# Patient Record
Sex: MALE | Race: OTHER | NOT HISPANIC OR LATINO | URBAN - METROPOLITAN AREA
[De-identification: names, ages, dates, MRNs, and addresses within clinical notes are randomized per-mention and may not be internally consistent; named-entity substitution may affect disease eponyms.]

---

## 2024-12-29 ENCOUNTER — INPATIENT (INPATIENT)
Facility: HOSPITAL | Age: 34
LOS: 1 days | Discharge: HOME | DRG: 443 | End: 2024-12-31
Attending: INTERNAL MEDICINE | Admitting: STUDENT IN AN ORGANIZED HEALTH CARE EDUCATION/TRAINING PROGRAM
Payer: COMMERCIAL

## 2024-12-29 VITALS
HEART RATE: 97 BPM | SYSTOLIC BLOOD PRESSURE: 132 MMHG | DIASTOLIC BLOOD PRESSURE: 80 MMHG | OXYGEN SATURATION: 98 % | TEMPERATURE: 99 F | RESPIRATION RATE: 19 BRPM

## 2024-12-29 DIAGNOSIS — R16.2 HEPATOMEGALY WITH SPLENOMEGALY, NOT ELSEWHERE CLASSIFIED: ICD-10-CM

## 2024-12-29 LAB
ALBUMIN SERPL ELPH-MCNC: 4.4 G/DL — SIGNIFICANT CHANGE UP (ref 3.5–5.2)
ALP SERPL-CCNC: 87 U/L — SIGNIFICANT CHANGE UP (ref 30–115)
ALT FLD-CCNC: 212 U/L — HIGH (ref 0–41)
ANION GAP SERPL CALC-SCNC: 13 MMOL/L — SIGNIFICANT CHANGE UP (ref 7–14)
APTT BLD: 29.5 SEC — SIGNIFICANT CHANGE UP (ref 27–39.2)
AST SERPL-CCNC: 165 U/L — HIGH (ref 0–41)
BASOPHILS # BLD AUTO: 0.06 K/UL — SIGNIFICANT CHANGE UP (ref 0–0.2)
BASOPHILS NFR BLD AUTO: 0.9 % — SIGNIFICANT CHANGE UP (ref 0–1)
BILIRUB SERPL-MCNC: 1 MG/DL — SIGNIFICANT CHANGE UP (ref 0.2–1.2)
BUN SERPL-MCNC: 16 MG/DL — SIGNIFICANT CHANGE UP (ref 10–20)
CALCIUM SERPL-MCNC: 9.1 MG/DL — SIGNIFICANT CHANGE UP (ref 8.4–10.5)
CHLORIDE SERPL-SCNC: 98 MMOL/L — SIGNIFICANT CHANGE UP (ref 98–110)
CO2 SERPL-SCNC: 24 MMOL/L — SIGNIFICANT CHANGE UP (ref 17–32)
CREAT SERPL-MCNC: 0.9 MG/DL — SIGNIFICANT CHANGE UP (ref 0.7–1.5)
EGFR: 116 ML/MIN/1.73M2 — SIGNIFICANT CHANGE UP
EOSINOPHIL # BLD AUTO: 0 K/UL — SIGNIFICANT CHANGE UP (ref 0–0.7)
EOSINOPHIL NFR BLD AUTO: 0 % — SIGNIFICANT CHANGE UP (ref 0–8)
FLUAV AG NPH QL: SIGNIFICANT CHANGE UP
FLUBV AG NPH QL: SIGNIFICANT CHANGE UP
GLUCOSE SERPL-MCNC: 101 MG/DL — HIGH (ref 70–99)
HCT VFR BLD CALC: 39.8 % — LOW (ref 42–52)
HGB BLD-MCNC: 13.5 G/DL — LOW (ref 14–18)
INR BLD: 0.94 RATIO — SIGNIFICANT CHANGE UP (ref 0.65–1.3)
LYMPHOCYTES # BLD AUTO: 0.69 K/UL — LOW (ref 1.2–3.4)
LYMPHOCYTES # BLD AUTO: 11.3 % — LOW (ref 20.5–51.1)
MCHC RBC-ENTMCNC: 30.5 PG — SIGNIFICANT CHANGE UP (ref 27–31)
MCHC RBC-ENTMCNC: 33.9 G/DL — SIGNIFICANT CHANGE UP (ref 32–37)
MCV RBC AUTO: 89.8 FL — SIGNIFICANT CHANGE UP (ref 80–94)
MONOCYTES # BLD AUTO: 0.91 K/UL — HIGH (ref 0.1–0.6)
MONOCYTES NFR BLD AUTO: 14.8 % — HIGH (ref 1.7–9.3)
NEUTROPHILS # BLD AUTO: 1.55 K/UL — SIGNIFICANT CHANGE UP (ref 1.4–6.5)
NEUTROPHILS NFR BLD AUTO: 25.2 % — LOW (ref 42.2–75.2)
PLATELET # BLD AUTO: 104 K/UL — LOW (ref 130–400)
PMV BLD: 11.7 FL — HIGH (ref 7.4–10.4)
POTASSIUM SERPL-MCNC: 4.1 MMOL/L — SIGNIFICANT CHANGE UP (ref 3.5–5)
POTASSIUM SERPL-SCNC: 4.1 MMOL/L — SIGNIFICANT CHANGE UP (ref 3.5–5)
PROT SERPL-MCNC: 7.4 G/DL — SIGNIFICANT CHANGE UP (ref 6–8)
PROTHROM AB SERPL-ACNC: 11.1 SEC — SIGNIFICANT CHANGE UP (ref 9.95–12.87)
RBC # BLD: 4.43 M/UL — LOW (ref 4.7–6.1)
RBC # FLD: 13.2 % — SIGNIFICANT CHANGE UP (ref 11.5–14.5)
RSV RNA NPH QL NAA+NON-PROBE: SIGNIFICANT CHANGE UP
SARS-COV-2 RNA SPEC QL NAA+PROBE: SIGNIFICANT CHANGE UP
SODIUM SERPL-SCNC: 135 MMOL/L — SIGNIFICANT CHANGE UP (ref 135–146)
WBC # BLD: 6.14 K/UL — SIGNIFICANT CHANGE UP (ref 4.8–10.8)
WBC # FLD AUTO: 6.14 K/UL — SIGNIFICANT CHANGE UP (ref 4.8–10.8)

## 2024-12-29 PROCEDURE — 86663 EPSTEIN-BARR ANTIBODY: CPT

## 2024-12-29 PROCEDURE — 85610 PROTHROMBIN TIME: CPT

## 2024-12-29 PROCEDURE — 93010 ELECTROCARDIOGRAM REPORT: CPT

## 2024-12-29 PROCEDURE — 83735 ASSAY OF MAGNESIUM: CPT

## 2024-12-29 PROCEDURE — 86850 RBC ANTIBODY SCREEN: CPT

## 2024-12-29 PROCEDURE — 87799 DETECT AGENT NOS DNA QUANT: CPT

## 2024-12-29 PROCEDURE — 99223 1ST HOSP IP/OBS HIGH 75: CPT

## 2024-12-29 PROCEDURE — 71045 X-RAY EXAM CHEST 1 VIEW: CPT | Mod: 26

## 2024-12-29 PROCEDURE — 74177 CT ABD & PELVIS W/CONTRAST: CPT | Mod: 26,MC

## 2024-12-29 PROCEDURE — 87389 HIV-1 AG W/HIV-1&-2 AB AG IA: CPT

## 2024-12-29 PROCEDURE — 80074 ACUTE HEPATITIS PANEL: CPT

## 2024-12-29 PROCEDURE — 86665 EPSTEIN-BARR CAPSID VCA: CPT

## 2024-12-29 PROCEDURE — 85025 COMPLETE CBC W/AUTO DIFF WBC: CPT

## 2024-12-29 PROCEDURE — 80053 COMPREHEN METABOLIC PANEL: CPT

## 2024-12-29 PROCEDURE — G0378: CPT

## 2024-12-29 PROCEDURE — 86664 EPSTEIN-BARR NUCLEAR ANTIGEN: CPT

## 2024-12-29 PROCEDURE — 86901 BLOOD TYPING SEROLOGIC RH(D): CPT

## 2024-12-29 PROCEDURE — 0241U: CPT

## 2024-12-29 PROCEDURE — 36415 COLL VENOUS BLD VENIPUNCTURE: CPT

## 2024-12-29 PROCEDURE — 93005 ELECTROCARDIOGRAM TRACING: CPT

## 2024-12-29 PROCEDURE — 99285 EMERGENCY DEPT VISIT HI MDM: CPT

## 2024-12-29 PROCEDURE — 85027 COMPLETE CBC AUTOMATED: CPT

## 2024-12-29 PROCEDURE — 76705 ECHO EXAM OF ABDOMEN: CPT

## 2024-12-29 PROCEDURE — 86900 BLOOD TYPING SEROLOGIC ABO: CPT

## 2024-12-29 PROCEDURE — 71045 X-RAY EXAM CHEST 1 VIEW: CPT

## 2024-12-29 NOTE — ED PROVIDER NOTE - OBJECTIVE STATEMENT
34-year-old male with no significant past medical history who presents to the ED for evaluation.  Reports that he has been having rectal bleeding for the past 1 week along with fatigue.  Also endorses intermittent subjective fever.  Denies chest pain, shortness of breath, nausea, vomiting, abdominal pain, urinary symptoms, and melena

## 2024-12-29 NOTE — ED PROVIDER NOTE - ATTENDING APP SHARED VISIT CONTRIBUTION OF CARE
33 y/o male with no sig pmhx in ER with c/o rectal bleeding.  Pt he has been having bleeding when he has BM - states stool is brown, but then blood drips out afterward.  Initially started 1 week ago, stopped for a few days, but then started again today.  denies any abdominal pain.  no n/v/d.  no cp/sob.  + generalized weakness and fatigue.  + subjective temp, + night sweats. no HA, no syncope. Pt states he had URI 2 weeks ago.  no longer having cough/congestion.  PE - nad, nc/at, eomi, perrl, op - clear, mmm, neck supple, cta b/l, no w/r/r, rrr, abd- soft, nt/nd, nabs, rectal (done by PA): no gross blood,  from x 4, no LE swelling/tenderness, A&O x 3, cn 2-12 intact, no focal motor/sensory deficits.   -check labs, ct abd.

## 2024-12-29 NOTE — ED ADULT NURSE NOTE - OBJECTIVE STATEMENT
pt A+Ox4, amb with steady gait.. C/O rectal bleeding for the past week with BM. Pt states blood is bright red. No pain in abd. Pt states he takes many supplements a day, including Metamucil 3 times a day.

## 2024-12-29 NOTE — ED ADULT NURSE NOTE - NSFALLUNIVINTERV_ED_ALL_ED
Bed/Stretcher in lowest position, wheels locked, appropriate side rails in place/Call bell, personal items and telephone in reach/Instruct patient to call for assistance before getting out of bed/chair/stretcher/Non-slip footwear applied when patient is off stretcher/[place] to call system/Physically safe environment - no spills, clutter or unnecessary equipment/Purposeful proactive rounding/Room/bathroom lighting operational, light cord in reach

## 2024-12-29 NOTE — ED PROVIDER NOTE - PHYSICAL EXAMINATION
CONSTITUTIONAL: in no apparent distress.   HEAD: Normocephalic; atraumatic.   EYES: Pupils are round and reactive, extra-ocular muscles are intact. Eyelids are normal in appearance without swelling or lesions.   ENT: Hearing is intact with good acuity to spoken voice.  Patient is speaking clearly, not muffled and airway is intact.   RESPIRATORY: No signs of respiratory distress. Lung sounds are clear in all lobes bilaterally without rales, rhonchi, or wheezes.  CARDIOVASCULAR: Regular rate and rhythm.   GI: Abdomen is soft, non-tender, and without distention. Bowel sounds are present and normoactive in all four quadrants. No masses are noted. Rectal: Chaperone: ***. Normal rectal sphincter tone. No external masses or lesions. No bright red blood or melena noticed.  Rectal: Chaperone: Manuela LIEBERMAN. Normal rectal sphincter tone. No external masses or lesions. No bright red blood or melena noticed.  NEURO: A & O x 3. Normal speech. No focal deficit.  PSYCHOLOGICAL: Appropriate mood and affect. Good judgement and insight.

## 2024-12-29 NOTE — ED PROVIDER NOTE - PROGRESS NOTE DETAILS
Patient is admitted for hepatosplenomegaly and low platelet. Pt is aware of the plan and agrees. Pt has been endorsed to [facility].

## 2024-12-30 LAB
ALBUMIN SERPL ELPH-MCNC: 4.2 G/DL — SIGNIFICANT CHANGE UP (ref 3.5–5.2)
ALP SERPL-CCNC: 83 U/L — SIGNIFICANT CHANGE UP (ref 30–115)
ALT FLD-CCNC: 203 U/L — HIGH (ref 0–41)
ANION GAP SERPL CALC-SCNC: 12 MMOL/L — SIGNIFICANT CHANGE UP (ref 7–14)
AST SERPL-CCNC: 152 U/L — HIGH (ref 0–41)
BILIRUB SERPL-MCNC: 1.1 MG/DL — SIGNIFICANT CHANGE UP (ref 0.2–1.2)
BUN SERPL-MCNC: 10 MG/DL — SIGNIFICANT CHANGE UP (ref 10–20)
CALCIUM SERPL-MCNC: 9.3 MG/DL — SIGNIFICANT CHANGE UP (ref 8.4–10.5)
CHLORIDE SERPL-SCNC: 101 MMOL/L — SIGNIFICANT CHANGE UP (ref 98–110)
CO2 SERPL-SCNC: 24 MMOL/L — SIGNIFICANT CHANGE UP (ref 17–32)
CREAT SERPL-MCNC: 0.9 MG/DL — SIGNIFICANT CHANGE UP (ref 0.7–1.5)
EGFR: 116 ML/MIN/1.73M2 — SIGNIFICANT CHANGE UP
GLUCOSE SERPL-MCNC: 99 MG/DL — SIGNIFICANT CHANGE UP (ref 70–99)
HCT VFR BLD CALC: 38.4 % — LOW (ref 42–52)
HGB BLD-MCNC: 12.9 G/DL — LOW (ref 14–18)
MAGNESIUM SERPL-MCNC: 2.3 MG/DL — SIGNIFICANT CHANGE UP (ref 1.8–2.4)
MCHC RBC-ENTMCNC: 30.4 PG — SIGNIFICANT CHANGE UP (ref 27–31)
MCHC RBC-ENTMCNC: 33.6 G/DL — SIGNIFICANT CHANGE UP (ref 32–37)
MCV RBC AUTO: 90.6 FL — SIGNIFICANT CHANGE UP (ref 80–94)
NRBC # BLD: 0 /100 WBCS — SIGNIFICANT CHANGE UP (ref 0–0)
PLATELET # BLD AUTO: 102 K/UL — LOW (ref 130–400)
PMV BLD: 12.7 FL — HIGH (ref 7.4–10.4)
POTASSIUM SERPL-MCNC: 4.3 MMOL/L — SIGNIFICANT CHANGE UP (ref 3.5–5)
POTASSIUM SERPL-SCNC: 4.3 MMOL/L — SIGNIFICANT CHANGE UP (ref 3.5–5)
PROT SERPL-MCNC: 7.1 G/DL — SIGNIFICANT CHANGE UP (ref 6–8)
RBC # BLD: 4.24 M/UL — LOW (ref 4.7–6.1)
RBC # FLD: 13.7 % — SIGNIFICANT CHANGE UP (ref 11.5–14.5)
SODIUM SERPL-SCNC: 137 MMOL/L — SIGNIFICANT CHANGE UP (ref 135–146)
WBC # BLD: 5.93 K/UL — SIGNIFICANT CHANGE UP (ref 4.8–10.8)
WBC # FLD AUTO: 5.93 K/UL — SIGNIFICANT CHANGE UP (ref 4.8–10.8)

## 2024-12-30 PROCEDURE — 99222 1ST HOSP IP/OBS MODERATE 55: CPT

## 2024-12-30 RX ORDER — INFLUENZA A VIRUS A/WISCONSIN/588/2019 (H1N1) RECOMBINANT HEMAGGLUTININ ANTIGEN, INFLUENZA A VIRUS A/DARWIN/6/2021 (H3N2) RECOMBINANT HEMAGGLUTININ ANTIGEN, INFLUENZA B VIRUS B/AUSTRIA/1359417/2021 RECOMBINANT HEMAGGLUTININ ANTIGEN, AND INFLUENZA B VIRUS B/PHUKET/3073/2013 RECOMBINANT HEMAGGLUTININ ANTIGEN 45; 45; 45; 45 UG/.5ML; UG/.5ML; UG/.5ML; UG/.5ML
0.5 INJECTION INTRAMUSCULAR ONCE
Refills: 0 | Status: DISCONTINUED | OUTPATIENT
Start: 2024-12-30 | End: 2024-12-31

## 2024-12-30 RX ORDER — PANTOPRAZOLE 40 MG/1
40 TABLET, DELAYED RELEASE ORAL
Refills: 0 | Status: DISCONTINUED | OUTPATIENT
Start: 2024-12-30 | End: 2024-12-31

## 2024-12-30 RX ORDER — ACETAMINOPHEN 80 MG/.8ML
650 SOLUTION/ DROPS ORAL ONCE
Refills: 0 | Status: COMPLETED | OUTPATIENT
Start: 2024-12-30 | End: 2024-12-30

## 2024-12-30 RX ORDER — GINKGO BILOBA 40 MG
3 CAPSULE ORAL AT BEDTIME
Refills: 0 | Status: DISCONTINUED | OUTPATIENT
Start: 2024-12-30 | End: 2024-12-31

## 2024-12-30 RX ADMIN — ACETAMINOPHEN 650 MILLIGRAM(S): 80 SOLUTION/ DROPS ORAL at 21:30

## 2024-12-30 RX ADMIN — ACETAMINOPHEN 650 MILLIGRAM(S): 80 SOLUTION/ DROPS ORAL at 20:15

## 2024-12-30 RX ADMIN — Medication 3 MILLIGRAM(S): at 21:18

## 2024-12-30 NOTE — H&P ADULT - ASSESSMENT
34-year-old male with no significant past medical history who presents for rectal bleeding for the past 1 week when he has BMs.  Pt states that stool is brown, but  then blood drips out afterward.  Initially started 1 week ago, stopped for a few days, but then started again today.  Pt denies abdominal pain, nausea, vomiting diarrhea, chest pain, shortness of breath, urinary symptoms, or melena. Pt endorses fatigue, wekaness, subjective fever, night sweats, Pt said that he had a URI two weeks ago, symptoms have no resolved.     #BRBPR  #Thrombocytopenia  #Hepatosplenomegaly   - pt reports BRBPR with stool for past 1 week, denies melena  - vitals stable, wnl  - denies abd pain, n/v/d  - CXR and RVP negative  - Hgb 12.5  -       #Elevated LFTs  #Hepatosplenomegaly   - ,   - CTAP 12/29: No evidence of acute abdominal pathology. Hepatosplenomegaly, with marked splenomegaly, 21.5 cm in length.  - Trend CMP    #MISC    #DVT prophylaxis: Lovenox  #GI prophylaxis: PPI  #Diet: NPO  #Activity: increase as jassi  #Code status: Full Code  #Disposition: admit to medicine    #PENDING     34-year-old male with no significant past medical history who presents for rectal bleeding for the past 1 week when he has BMs.  Pt states that stool is brown, but  then bright red blood drips out afterward.  Initially started 1 week ago, stopped for a few days, but then started again today.  Pt denies abdominal pain, nausea, vomiting diarrhea, chest pain, shortness of breath, urinary symptoms, or melena. Pt endorses fatigue, weakness, subjective fever, night sweats, Pt said he has had night sweats for past two nights. Pt said that he had a URI two weeks ago, never had GI symptoms, URI symptoms have now resolved. Pt drinks alcohol (a few beers) on weekends. Denies other substance or tobacco use. Pt is sexually active with multiple female partners. No recent travel. Pt takes many OTC supplements and protein powder but no prescription medication.     #BRBPR  #Thrombocytopenia  - pt reports BRBPR with stool for past 1 week, denies melena  - vitals stable, wnl  - denies abd pain, n/v/d  - CXR and RVP negative  - Hgb 12.5  -   - trend CBC  - active T&S    #Elevated LFTs  #Hepatosplenomegaly   - ,   - CTAP 12/29: No evidence of acute abdominal pathology. Hepatosplenomegaly, with marked splenomegaly, 21.5 cm in length.  - Trend CMP  - f/u hep panel   - f/u hepatology consult     #MISC  #DVT prophylaxis: holding  #GI prophylaxis: PPI  #Diet: NPO  #Activity: increase as jassi  #Code status: Full Code  #Disposition: admit to medicine    #PENDING  - f/u hep panel   - f/u hepatology consult   - trend CBC  - active T&S

## 2024-12-30 NOTE — H&P ADULT - NSHPPHYSICALEXAM_GEN_ALL_CORE
GENERAL: NAD, lying in bed   HEAD:  Atraumatic, normocephalic  EYES: conjunctiva and sclera clear  ENT: Moist mucous membranes  NECK: Supple, no JVD  HEART: Regular rate and rhythm, no murmurs, rubs, or gallops  LUNGS: Unlabored respirations.  Clear to auscultation bilaterally, no crackles, wheezing, or rhonchi  ABDOMEN: Soft, nontender, nondistended, +BS  EXTREMITIES: 2+ peripheral pulses bilaterally. No clubbing, cyanosis, or edema  NERVOUS SYSTEM:  A&Ox3, no focal deficits   SKIN: No rashes or lesions

## 2024-12-30 NOTE — CONSULT NOTE ADULT - ATTENDING COMMENTS
34 y o  M who presented with bloody stool seen for elevated liver enzymes with hepatosplenomegaly.     Has been sick for 2 weeks. Started with cough about 2 weeks ago with night sweats and fatigue.  No sore throat or myalgia.  No wt loss.  No sick contacts.      Looks well  Oral cavity - unable to visualize tonsillar fossa  No cervical or axillary LNE  Abdomen soft non tender    Acute hepatitis with hepatosplenomegaly and thrombocytopenia - likely EBV infection  Patient has acute symptoms reactive lymphocytes with monocytosis thrombocytopenia and hepatosplenomegaly suggestive of viral infection most likely EBV.    Check EBV serology and PCR  Screen for acute hepatitis  Monitor liver tests INR

## 2024-12-30 NOTE — CONSULT NOTE ADULT - ASSESSMENT
Assessment and Plan  Case of a 34 year old male patient with history of alcohol use who presented to the ED on 12/29 for evaluation of bloody stools x1 week associated with recent fatigue, night sweats, and subjective fevers, found to have elevated liver enzymes and thrombocytopenia on blood work with evidence of hepatosplenomegaly on imaging. We are consulted for elevated liver enzymes.      Elevated Liver Enzymes: Mainly Hepatocellular Injury  Hepatosplenomegaly with Thrombocytopenia and B symptoms: rule out lymphoma  Rule Out DILI In Setting of Recent Amoxicillin Course  Unlikely Alcoholic Hepatitis  * History: notes red blood per rectum with brown stools (2-3 times daily for 4 days last week; then stopped for 3 days then recurred on 12/29 x1 episode); denies abdominal pain or nausea or vomiting or unintentional weight loss; ROS positive for productive cough, fevers, chills, rhinorrhea 2 weeks ago s/p urgent care visit and dx of bronchitis (received amoxicillin/steroids; denies sick contacts at home but suspects sick contacts at work); denies new medications or herbal supplements  * Social history: drinks few beers on weekends (socially; upto 10 beers); ex smoker; denies IVDU; sexually active with multiple female partners; uses protein powder  * Hemodynamically stable during admission; reported subjective fevers prior to admission    * Labs: WBC 5.93, Hb 12.9, Plt 102, Na 137, K 4.3, BUN 10, Cr 0.9 on 12/30  * Hb trend: 13.5 on 12/29 -> 12.9 on 12/30 (baseline Hb 14.8 in 2015)  * Plt trend: 104 on 12/29 -> 102 on 12/30 (baseline plt 151 in 2015)  * Liver enzymes: 1/87/165/212 on 12/29 -> 1.1/83/152/203 on 12/30 (baseline: 0.3/56/26/32 in 2015)  * INR 0.94 on 12/29  * COVID Influenza and RSV - on 12/29  * Acute hepatitis panel pending  * No US abdomen; No TTE in chart  * CT AP IC 12/29 mild hepatomegaly 19.5cm; splenomegaly 21.5 cm; normal GB and CBD  * No previous EGD/Colonoscopy per patient  * Family history of celiac disease in daughter; no GI or liver cancers in family    RECOMMENDATIONS  - Trend liver enzymes  - Check acute hepatitis A/B/C panel  - Check EBV serology and DNA PCR; CMV  - Check US abdomen and TTE  - Consider hematology evaluation in setting of HSPM, thrombocytopenia, and B symptoms; would check peripheral smear (and consider BM biopsy if warranted)  - Counseled about importance of alcohol cessation. SBIRT consult pending  - Avoid hepatotoxic agents  - Monitor MAP and keep > 65mmH. Monitor for fever and check sepsis workup (reported subjective fevers)  - Follow up with our [facility] for fibroscan (located at [address] [place] [address], [address]. Telephone No: [phone])      Reported Hematochezia Likely Secondary to Hemorrhoidal bleeding; rule out malignancy VS Angiodysplasia VS other sources  No Anemia  * Hemodynamically stable & no active bleeding  * Baseline hemoglobin (prior to admission):   * Labs: WBC 5.93, Hb 12.9, Plt 102, Na 137, K 4.3, BUN 10, Cr 0.9 on 12/30  * Hb trend: 13.5 on 12/29 -> 12.9 on 12/30 (baseline Hb 14.8 in 2015)  * Plt trend: 104 on 12/29 -> 102 on 12/30 (baseline plt 151 in 2015)  * Coags: INR 0.94 on 12/29  * Not on AP or AC  * CHEL not performed on 12/30 per patient since he was in [facility]  * CT imaging as above  * No previous EGD/Colonoscopy per patient  * Family history of celiac disease in daughter; no GI or liver cancers in family    RECOMMENDATIONS  - In the setting of reported hematochezia, patient would benefit from colonoscopy   - After discussing the risks and benefits of the procedure with the patient, decision was made to perform procedure as an outpatient (unless Hb downtrends or bleeding recurs). Follow up with our [facility] located at [address] [place] [address]. Phone Number: [phone]    - In the setting of stable Hb and absence of overt bleeding, can advance diet as tolerated  - Trend CBC closely and transfuse as needed (target Hb >7). Maintain active Type and screen  - Trend BUN  - Ensure placement of x2 large 18G IV Bores  - Monitor MAP and keep >65mmhg   - Continue PO pantoprazole 40 mg QD  - Monitor BM for hematochezia. Avoid constipation  - Please avoid any NSAIDs  - If any unstable bleed, please check CT angio AP IC STAT and call GI/IR stat      Thank you for your consult.  - Please note that plan was communicated with medical team.   - Please reach GI on [phone] during weekdays till 5pm.  - Please call the GI service line after 5pm on Weekdays and anytime on Weekends: [phone].      Maricel Torrez MD  PGY - 5 Gastroenterology Fellow   [facility]     Assessment and Plan  Case of a 34 year old male patient with history of alcohol use who presented to the ED on 12/29 for evaluation of bloody stools x1 week associated with recent fatigue, night sweats, and subjective fevers, found to have elevated liver enzymes and thrombocytopenia on blood work with evidence of hepatosplenomegaly on imaging. We are consulted for elevated liver enzymes.      Elevated Liver Enzymes: Mainly Hepatocellular Injury  Hepatosplenomegaly with Thrombocytopenia and B symptoms: rule out Viral Infection (EBV, etc..) VS lymphoma  Rule Out DILI In Setting of Recent Amoxicillin Course  Unlikely Alcoholic Hepatitis  * History: notes red blood per rectum with brown stools (2-3 times daily for 4 days last week; then stopped for 3 days then recurred on 12/29 x1 episode); denies abdominal pain or nausea or vomiting or unintentional weight loss; ROS positive for productive cough, fevers, chills, rhinorrhea 2 weeks ago s/p urgent care visit and dx of bronchitis (received amoxicillin/steroids; denies sick contacts at home but suspects sick contacts at work); denies new medications or herbal supplements  * Social history: drinks few beers on weekends (socially; upto 10 beers); ex smoker; denies IVDU; sexually active with multiple female partners; uses protein powder  * Hemodynamically stable during admission; reported subjective fevers prior to admission    * Labs: WBC 5.93, Hb 12.9, Plt 102, Na 137, K 4.3, BUN 10, Cr 0.9 on 12/30  * Hb trend: 13.5 on 12/29 -> 12.9 on 12/30 (baseline Hb 14.8 in 2015)  * Plt trend: 104 on 12/29 -> 102 on 12/30 (baseline plt 151 in 2015)  * Liver enzymes: 1/87/165/212 on 12/29 -> 1.1/83/152/203 on 12/30 (baseline: 0.3/56/26/32 in 2015)  * INR 0.94 on 12/29  * COVID Influenza and RSV - on 12/29  * Acute hepatitis panel pending  * No US abdomen; No TTE in chart  * CT AP IC 12/29 mild hepatomegaly 19.5cm; splenomegaly 21.5 cm; normal GB and CBD  * No previous EGD/Colonoscopy per patient  * Family history of celiac disease in daughter; no GI or liver cancers in family    RECOMMENDATIONS  - Trend liver enzymes  - Check acute hepatitis A/B/C panel  - Check EBV serology and DNA PCR; CMV. Avoid contact sports  - Check US abdomen and TTE  - Consider hematology evaluation in setting of HSPM, thrombocytopenia, and B symptoms; would check peripheral smear (and consider BM biopsy if warranted)  - Counseled about importance of alcohol cessation. SBIRT consult pending  - Avoid hepatotoxic agents  - Monitor MAP and keep > 65mmH. Monitor for fever and check sepsis workup (reported subjective fevers)  - Follow up with our [facility] for fibroscan (located at [address] [place] [address], [address]. Telephone No: [phone])      Reported Hematochezia Likely Secondary to Hemorrhoidal bleeding; rule out malignancy VS Angiodysplasia VS other sources  No Anemia  * Hemodynamically stable & no active bleeding  * Baseline hemoglobin (prior to admission):   * Labs: WBC 5.93, Hb 12.9, Plt 102, Na 137, K 4.3, BUN 10, Cr 0.9 on 12/30  * Hb trend: 13.5 on 12/29 -> 12.9 on 12/30 (baseline Hb 14.8 in 2015)  * Plt trend: 104 on 12/29 -> 102 on 12/30 (baseline plt 151 in 2015)  * Coags: INR 0.94 on 12/29  * Not on AP or AC  * CHEL not performed on 12/30 per patient since he was in [facility]  * CT imaging as above  * No previous EGD/Colonoscopy per patient  * Family history of celiac disease in daughter; no GI or liver cancers in family    RECOMMENDATIONS  - In the setting of reported hematochezia, patient would benefit from colonoscopy   - After discussing the risks and benefits of the procedure with the patient, decision was made to perform procedure as an outpatient (unless Hb downtrends or bleeding recurs). Follow up with our [facility] located at [address] [place] [address]. Phone Number: [phone]    - In the setting of stable Hb and absence of overt bleeding, can advance diet as tolerated  - Trend CBC closely and transfuse as needed (target Hb >7). Maintain active Type and screen  - Trend BUN  - Ensure placement of x2 large 18G IV Bores  - Monitor MAP and keep >65mmhg   - Continue PO pantoprazole 40 mg QD  - Monitor BM for hematochezia. Avoid constipation  - Please avoid any NSAIDs  - If any unstable bleed, please check CT angio AP IC STAT and call GI/IR stat      Thank you for your consult.  - Please note that plan was communicated with medical team.   - Please reach GI on [phone] during weekdays till 5pm.  - Please call the GI service line after 5pm on Weekdays and anytime on Weekends: [phone].      Maricel Torrez MD  PGY - 5 Gastroenterology Fellow   [facility]     Assessment and Plan  Case of a 34 year old male patient with history of alcohol use who presented to the ED on 12/29 for evaluation of bloody stools x1 week associated with recent fatigue, night sweats, and subjective fevers, found to have elevated liver enzymes and thrombocytopenia on blood work with evidence of hepatosplenomegaly on imaging. We are consulted for elevated liver enzymes.      Elevated Liver Enzymes: Mainly Hepatocellular Injury  Hepatosplenomegaly with Thrombocytopenia and B symptoms: rule out Viral Infection (EBV, etc..) VS lymphoma  Rule Out DILI In Setting of Recent Amoxicillin Course  Unlikely Alcoholic Hepatitis  * History: notes red blood per rectum with brown stools (2-3 times daily for 4 days last week; then stopped for 3 days then recurred on 12/29 x1 episode); denies abdominal pain or nausea or vomiting or unintentional weight loss; ROS positive for productive cough, fevers, chills, rhinorrhea 2 weeks ago s/p urgent care visit and dx of bronchitis (received amoxicillin/steroids; denies sick contacts at home but suspects sick contacts at work); denies new medications or herbal supplements  * Social history: drinks few beers on weekends (socially; upto 10 beers); ex smoker; denies IVDU; sexually active with multiple female partners; uses protein powder  * Hemodynamically stable during admission; reported subjective fevers prior to admission    * Labs: WBC 5.93, Hb 12.9, Plt 102, Na 137, K 4.3, BUN 10, Cr 0.9 on 12/30  * Hb trend: 13.5 on 12/29 -> 12.9 on 12/30 (baseline Hb 14.8 in 2015)  * Plt trend: 104 on 12/29 -> 102 on 12/30 (baseline plt 151 in 2015)  * Liver enzymes: 1/87/165/212 on 12/29 -> 1.1/83/152/203 on 12/30 (baseline: 0.3/56/26/32 in 2015)  * INR 0.94 on 12/29  * COVID Influenza and RSV - on 12/29  * Acute hepatitis panel pending  * No US abdomen; No TTE in chart  * CT AP IC 12/29 mild hepatomegaly 19.5cm; splenomegaly 21.5 cm; normal GB and CBD  * No previous EGD/Colonoscopy per patient  * Family history of celiac disease in daughter; no GI or liver cancers in family    RECOMMENDATIONS  - Trend liver enzymes  - Check acute hepatitis A/B/C panel  - Check EBV serology and DNA PCR; CMV. Avoid contact sports  - Check US abdomen and TTE  - Would check peripheral smear. If EBV-, consider hematology evaluation in setting of HSPM, thrombocytopenia, and B symptoms (consider BM bx if warranted)  - Counseled about importance of alcohol cessation. SBIRT consult pending  - Avoid hepatotoxic agents  - Monitor MAP and keep > 65mmH. Monitor for fever and check sepsis workup (reported subjective fevers)  - Follow up with our [facility] for fibroscan (located at [address] [place] [address], [address]. Telephone No: [phone])      Reported Hematochezia Likely Secondary to Hemorrhoidal bleeding; rule out malignancy VS Angiodysplasia VS other sources  No Anemia  * Hemodynamically stable & no active bleeding  * Baseline hemoglobin (prior to admission):   * Labs: WBC 5.93, Hb 12.9, Plt 102, Na 137, K 4.3, BUN 10, Cr 0.9 on 12/30  * Hb trend: 13.5 on 12/29 -> 12.9 on 12/30 (baseline Hb 14.8 in 2015)  * Plt trend: 104 on 12/29 -> 102 on 12/30 (baseline plt 151 in 2015)  * Coags: INR 0.94 on 12/29  * Not on AP or AC  * CHEL not performed on 12/30 per patient since he was in [facility]  * CT imaging as above  * No previous EGD/Colonoscopy per patient  * Family history of celiac disease in daughter; no GI or liver cancers in family    RECOMMENDATIONS  - In the setting of reported hematochezia, patient would benefit from colonoscopy   - After discussing the risks and benefits of the procedure with the patient, decision was made to perform procedure as an outpatient (unless Hb downtrends or bleeding recurs). Follow up with our [facility] located at [address] [place] [address]. Phone Number: [phone]    - In the setting of stable Hb and absence of overt bleeding, can advance diet as tolerated  - Trend CBC closely and transfuse as needed (target Hb >7). Maintain active Type and screen  - Trend BUN  - Ensure placement of x2 large 18G IV Bores  - Monitor MAP and keep >65mmhg   - Continue PO pantoprazole 40 mg QD  - Monitor BM for hematochezia. Avoid constipation  - Please avoid any NSAIDs  - If any unstable bleed, please check CT angio AP IC STAT and call GI/IR stat      Thank you for your consult.  - Please note that plan was communicated with medical team.   - Please reach GI on [phone] during weekdays till 5pm.  - Please call the GI service line after 5pm on Weekdays and anytime on Weekends: [phone].      Maricel Torrez MD  PGY - 5 Gastroenterology Fellow   [facility]     Assessment and Plan  Case of a 34 year old male patient with history of alcohol use who presented to the ED on 12/29 for evaluation of bloody stools x1 week associated with recent fatigue, night sweats, and subjective fevers, found to have elevated liver enzymes and thrombocytopenia on blood work with evidence of hepatosplenomegaly on imaging. We are consulted for elevated liver enzymes.      Elevated Liver Enzymes: Mainly Hepatocellular Injury  Hepatosplenomegaly with Thrombocytopenia and B symptoms: rule out Viral Infection (EBV, etc..) VS lymphoma  Rule Out DILI In Setting of Recent Amoxicillin Course  Unlikely Alcoholic Hepatitis  * History: notes red blood per rectum with brown stools (2-3 times daily for 4 days last week; then stopped for 3 days then recurred on 12/29 x1 episode); denies abdominal pain or nausea or vomiting or unintentional weight loss; ROS positive for productive cough, fevers, chills, rhinorrhea 2 weeks ago s/p urgent care visit and dx of bronchitis (received amoxicillin/steroids; denies sick contacts at home but suspects sick contacts at work); denies new medications or herbal supplements  * Social history: drinks few beers on weekends (socially; upto 10 beers); ex smoker; denies IVDU; sexually active with multiple female partners; uses protein powder  * Hemodynamically stable during admission; reported subjective fevers prior to admission    * Labs: WBC 5.93, Hb 12.9, Plt 102, Na 137, K 4.3, BUN 10, Cr 0.9 on 12/30  * Hb trend: 13.5 on 12/29 -> 12.9 on 12/30 (baseline Hb 14.8 in 2015)  * Plt trend: 104 on 12/29 -> 102 on 12/30 (baseline plt 151 in 2015)  * Liver enzymes: 1/87/165/212 on 12/29 -> 1.1/83/152/203 on 12/30 (baseline: 0.3/56/26/32 in 2015)  * INR 0.94 on 12/29  * COVID Influenza and RSV - on 12/29  * Acute hepatitis panel pending  * No US abdomen; No TTE in chart  * CT AP IC 12/29 mild hepatomegaly 19.5cm; splenomegaly 21.5 cm; normal GB and CBD  * No previous EGD/Colonoscopy per patient  * Family history of celiac disease in daughter; no GI or liver cancers in family    RECOMMENDATIONS  - Trend liver enzymes  - Check acute hepatitis A/B/C panel  - Check EBV serology and DNA PCR. Avoid contact sports; counseled about risk of splenic rupture   - Check US abdomen and TTE  - Would check peripheral smear. If EBV-, consider hematology evaluation in setting of HSPM, thrombocytopenia, and B symptoms (BM bx if warranted)  - Counseled about importance of alcohol cessation. SBIRT consult pending  - Avoid hepatotoxic agents  - Monitor MAP and keep > 65mmH. Monitor for fever and check sepsis workup (reported subjective fevers)  - Follow up with our [facility] for fibroscan (located at [address] [place] [address], [id number]. Telephone No: [phone])      Reported Hematochezia Likely Secondary to Hemorrhoidal bleeding; rule out malignancy VS Angiodysplasia VS other sources  No Anemia  * Hemodynamically stable & no active bleeding  * Baseline hemoglobin (prior to admission):   * Labs: WBC 5.93, Hb 12.9, Plt 102, Na 137, K 4.3, BUN 10, Cr 0.9 on 12/30  * Hb trend: 13.5 on 12/29 -> 12.9 on 12/30 (baseline Hb 14.8 in 2015)  * Plt trend: 104 on 12/29 -> 102 on 12/30 (baseline plt 151 in 2015)  * Coags: INR 0.94 on 12/29  * Not on AP or AC  * CHEL not performed on 12/30 per patient since he was in [place]  * CT imaging as above  * No previous EGD/Colonoscopy per patient  * Family history of celiac disease in daughter; no GI or liver cancers in family    RECOMMENDATIONS  - In the setting of reported hematochezia, patient would benefit from colonoscopy   - After discussing the risks and benefits of the procedure with the patient, decision was made to perform procedure as an outpatient (unless Hb downtrends or bleeding recurs). Follow up with our [facility] located at [address] [place] [address]. Phone Number: [phone]    - In the setting of stable Hb and absence of overt bleeding, can advance diet as tolerated  - Trend CBC closely and transfuse as needed (target Hb >7). Maintain active Type and screen  - Trend BUN  - Ensure placement of x2 large 18G IV Bores  - Monitor MAP and keep >65mmhg   - Continue PO pantoprazole 40 mg QD  - Monitor BM for hematochezia. Avoid constipation  - Please avoid any NSAIDs  - If any unstable bleed, please check CT angio AP IC STAT and call GI/IR stat      Thank you for your consult.  - Please note that plan was communicated with medical team.   - Please reach GI on [phone] during weekdays till 5pm.  - Please call the GI service line after 5pm on Weekdays and anytime on Weekends: [phone].      Maricel Torrez MD  PGY - 5 Gastroenterology Fellow   [facility]

## 2024-12-30 NOTE — PROGRESS NOTE ADULT - ASSESSMENT
33 yo M no PMHx presented for BRPBR. Reports intermittent over 1 week. Stool is formed but not hard. No blood in stool "drips out" after BM. Reports URI 2 weeks ago that caused severe exhaustion. Upon ER arrival pt found to have hepatosplenomegaly, transaminitis, thrombocytopenia.    Thrombocytopneia  Elevated LFT  Hepatosplenomegaly  - recent URI/exhaustion  - possible EBV  - hepatology eval requested  - EBV sent, check CMV, HIV, hepatitis panel    BRPBR  - suspect hemorrhoidal bleeding  - hgb did decreased from 13.5->12.8 but that could be from hemoconcentation on admission  - GI eval  - pt reports CHEL in ED but I do not see results documented. Pt refusing repeat CHEL    DVT px--hold pharm px due to bleeding and thrombocytopenia    #Progress Note Handoff:  Pending (specify):  Hepatology, GI eval, trend cbc  Family discussion: As above with pt  Disposition: Home_x__/SNF___/Other________/Unknown at this time________ 33 yo M no PMHx presented for BRPBR. Reports intermittent over 1 week. Stool is formed but not hard. No blood in stool "drips out" after BM. Reports URI 2 weeks ago that caused severe exhaustion. Upon ER arrival pt found to have hepatosplenomegaly, transaminitis, thrombocytopenia.    Thrombocytopneia  Elevated LFT  Hepatosplenomegaly  - recent URI/exhaustion  - possible EBV  - hepatology eval requested  - has reactive lymphocytes which further suggests EBV  - no lymphadenopathy on imaging suggesting against hematologic cause of smptoms  - EBV sent, check CMV, HIV, hepatitis panel  - pt denies any new medications/supplements. Occassional etoh    BRPBR  - suspect hemorrhoidal bleeding  - hgb did decreased from 13.5->12.8 but that could be from hemoconcentation on admission  - GI eval  - pt reports CHEL in ED but I do not see results documented. Pt refusing repeat CHEL    DVT px--hold pharm px due to bleeding and thrombocytopenia    #Progress Note Handoff:  Pending (specify):  Hepatology, GI eval, trend cbc. Pt likely with EBV and can likely be dc home today with contact precautions for splenomegaly but given thrombocytopenia, downward trend hgb, and BRPR recommend continued monitoring to sure stabiliy of cbc and monitor for further symptoms  Family discussion: As above with pt  Disposition: Home_x__/SNF___/Other________/Unknown at this time________

## 2024-12-30 NOTE — H&P ADULT - HIV OFFER
Opt out Rhombic Flap Text: The defect edges were debeveled with a #15 scalpel blade.  Given the location of the defect and the proximity to free margins a rhombic flap was deemed most appropriate.  Using a sterile surgical marker, an appropriate rhombic flap was drawn incorporating the defect.    The area thus outlined was incised deep to adipose tissue with a #15 scalpel blade.  The skin margins were undermined to an appropriate distance in all directions utilizing iris scissors.

## 2024-12-30 NOTE — H&P ADULT - HISTORY OF PRESENT ILLNESS
34-year-old male with no significant past medical history who presents for rectal bleeding for the past 1 week when he has BMs.  Pt states that stool is brown, but  then blood drips out afterward.  Initially started 1 week ago, stopped for a few days, but then started again today.  Pt denies abdominal pain, nausea, vomiting diarrhea, chest pain, shortness of breath, urinary symptoms, or melena. Pt endorses fatigue, wekaness, subjective fever, night sweats, Pt said that he had a URI two weeks ago, symptoms have no resolved.     VITALS:   T(C): 36.9 (12-29-24 @ 21:16), Max: 37.2 (12-29-24 @ 13:38)  HR: 92 (12-29-24 @ 21:16) (92 - 97)  BP: 123/76 (12-29-24 @ 21:16) (123/76 - 132/80)  RR: 18 (12-29-24 @ 21:16) (18 - 19)  SpO2: 99% (12-29-24 @ 21:16) (98% - 99%)      IMAGING:  - CXR 12/29: No radiographic evidence of acute cardiopulmonary disease.  - ctap 12/29: No evidence of acute abdominal pathology. Hepatosplenomegaly, with marked splenomegaly, 21.5 cm in length.    LABS:  - Hgb 12.5  -   - ,   - RVP neg 34-year-old male with no significant past medical history who presents for rectal bleeding for the past 1 week when he has BMs.  Pt states that stool is brown, but  then bright red blood drips out afterward.  Initially started 1 week ago, stopped for a few days, but then started again today.  Pt denies abdominal pain, nausea, vomiting diarrhea, chest pain, shortness of breath, urinary symptoms, or melena. Pt endorses fatigue, weakness, subjective fever, night sweats, Pt said he has had night sweats for past two nights. Pt said that he had a URI two weeks ago, never had GI symptoms, URI symptoms have now resolved. Pt drinks alcohol (a few beers) on weekends. Denies other substance or tobacco use. Pt is sexually active with multiple female partners. No recent travel. Pt takes many OTC supplements and protein powder but no prescription medication.     VITALS:   T(C): 36.9 (12-29-24 @ 21:16), Max: 37.2 (12-29-24 @ 13:38)  HR: 92 (12-29-24 @ 21:16) (92 - 97)  BP: 123/76 (12-29-24 @ 21:16) (123/76 - 132/80)  RR: 18 (12-29-24 @ 21:16) (18 - 19)  SpO2: 99% (12-29-24 @ 21:16) (98% - 99%)      IMAGING:  - CXR 12/29: No radiographic evidence of acute cardiopulmonary disease.  - ctap 12/29: No evidence of acute abdominal pathology. Hepatosplenomegaly, with marked splenomegaly, 21.5 cm in length.    LABS:  - Hgb 12.5  -   - ,   - RVP neg

## 2024-12-30 NOTE — H&P ADULT - ATTENDING COMMENTS
4-year-old male with no significant past medical history who presents to the ED for evaluation.  Reports that he has been having rectal bleeding for the past 1 week along with fatigue.  Also endorses intermittent subjective fever.     Agree  with assessment  except for changes below.   Vital Signs Last 24 Hrs  T(C): 36.9 (29 Dec 2024 21:16), Max: 37.2 (29 Dec 2024 13:38)  T(F): 98.4 (29 Dec 2024 21:16), Max: 98.9 (29 Dec 2024 13:38)  HR: 92 (29 Dec 2024 21:16) (92 - 97)  BP: 123/76 (29 Dec 2024 21:16) (123/76 - 132/80)  BP(mean): --  RR: 18 (29 Dec 2024 21:16) (18 - 19)  SpO2: 99% (29 Dec 2024 21:16) (98% - 99%)    Parameters below as of 29 Dec 2024 21:16  Patient On (Oxygen Delivery Method): room air      IMPRESSION   Suspected Lower GI Bleed   Complicated by Hepatosplenomegaly, thrombocytopenia, and transaminitis, fevers   Abdominal Peripheral Smear   Clear liquid diet for now. NPO after midnight. IV fluid hydration and trend LA  Trend CBC closely BID and transfuse as needed (target Hb >8). Maintain active Type and screen  Please correct electrolytes (Target Na 135-145, Mg 1.7-2.2, K 3.5-5)  Check coagulation profile. Please correct INR to <1.5  Trend BUN  x2 large 18G IV Bores  Please start pantoprazole 40 IV BID  Monitor BM and for recurrence of coffee ground emesis  Please avoid any NSAIDs  If any unstable bleed, please call GI stat  Check CMV herpes simplex, coxsackievirus B, and enteroviral, HIV, B12,   Trend  LFTs  GI Consult 4-year-old male with no significant past medical history who presents to the ED for evaluation.  Reports that he has been having rectal bleeding for the past 1 week along with fatigue.  Also endorses intermittent subjective fever.     Agree  with assessment  except for changes below.   Vital Signs Last 24 Hrs  T(C): 36.9 (29 Dec 2024 21:16), Max: 37.2 (29 Dec 2024 13:38)  T(F): 98.4 (29 Dec 2024 21:16), Max: 98.9 (29 Dec 2024 13:38)  HR: 92 (29 Dec 2024 21:16) (92 - 97)  BP: 123/76 (29 Dec 2024 21:16) (123/76 - 132/80)  BP(mean): --  RR: 18 (29 Dec 2024 21:16) (18 - 19)  SpO2: 99% (29 Dec 2024 21:16) (98% - 99%)    Parameters below as of 29 Dec 2024 21:16  Patient On (Oxygen Delivery Method): room air    PHYSICAL EXAM  GENERAL: NAD,  HEAD:  NCAT, EOMI, MM  NECK: Supple, Nontender  NERVOUS SYSTEM:  AAOx3, NFD  CHEST/LUNG: +bs b/l, No wheezing   HEART: +s1s2 RRR  ABDOMEN: soft, NT/ND  EXTREMITIES:  pp, no edema  SKIN: age related skin changes       IMPRESSION   Suspected Lower GI Bleed   Complicated by Hepatosplenomegaly, thrombocytopenia, and transaminitis, fevers   Abdominal Peripheral Smear   Clear liquid diet for now. NPO after midnight. IV fluid hydration and trend LA  Trend CBC closely BID and transfuse as needed (target Hb >8). Maintain active Type and screen  Please correct electrolytes (Target Na 135-145, Mg 1.7-2.2, K 3.5-5)  Check coagulation profile. Please correct INR to <1.5  Trend BUN  x2 large 18G IV Bores  Please start pantoprazole 40 IV BID  Monitor BM and for recurrence of coffee ground emesis  Please avoid any NSAIDs  If any unstable bleed, please call GI stat  Check CMV herpes simplex, coxsackievirus B, and enteroviral, HIV, B12,   Trend  LFTs  GI Consult    Seen in 12/29

## 2024-12-30 NOTE — CONSULT NOTE ADULT - SUBJECTIVE AND OBJECTIVE BOX
Hepatology Initial Consult Note      Location: [address] ([address])  Patient Name: NAZIA STAPLETON  Age: 34y  Gender: Male      Chief Complaint  Patient is a 34y old Male who presents with a chief complaint of   Primary diagnosis of Hepatomegaly with splenomegaly, not elsewhere classified      Reason for Consult  Elevated Liver Enzymes  Hematochezia      History of Present Illness  34 year old male patient with history of alcohol use who presented to the ED on 12/29 for evaluation of bloody stools x1 week associated with recent fatigue, night sweats, and subjective fevers, found to have elevated liver enzymes and thrombocytopenia on blood work with evidence of hepatosplenomegaly on imaging. We are consulted for elevated liver enzymes.  Summary:  * History: notes red blood per rectum with brown stools (2-3 times daily for 4 days last week; then stopped for 3 days then recurred on 12/29 x1 episode); denies abdominal pain or nausea or vomiting or unintentional weight loss; ROS positive for productive cough, fevers, chills, rhinorrhea 2 weeks ago s/p urgent care visit and dx of bronchitis (received amoxicillin/steroids; denies sick contacts at home but suspects sick contacts at work); denies new medications or herbal supplements  * Social history: drinks few beers on weekends (socially; upto 10 beers); ex smoker; denies IVDU; sexually active with multiple female partners; uses protein powder  * Hemodynamically stable during admission; reported subjective fevers prior to admission    * Labs: WBC 5.93, Hb 12.9, Plt 102, Na 137, K 4.3, BUN 10, Cr 0.9 on 12/30  * Hb trend: 13.5 on 12/29 -> 12.9 on 12/30 (baseline Hb 14.8 in 2015)  * Plt trend: 104 on 12/29 -> 102 on 12/30 (baseline plt 151 in 2015)  * Liver enzymes: 1/87/165/212 on 12/29 -> 1.1/83/152/203 on 12/30 (baseline: 0.3/56/26/32 in 2015)  * INR 0.94 on 12/29  * COVID Influenza and RSV - on 12/29  * Acute hepatitis panel pending  * No US abdomen; No TTE in chart  * CT AP IC 12/29 mild hepatomegaly 19.5cm; splenomegaly 21.5 cm; normal GB and CBD  * No previous EGD/Colonoscopy per patient  * Family history of celiac disease in daughter; no GI or liver cancers in family      Prior EGD:  no prior      Prior Colonoscopy:  no prior      Past Medical and Surgical History:  per [facility]      Home Medications:  Home Medications:      Social History:  Tobacco: as below  Alcohol: as below  Drugs: as below      Allergies:  No Known Allergies      Family History:  as below        Vital Signs in the last 24 hours   Vitals Summary T(C): 36.7 (12-30-24 @ 06:09), Max: 37.2 (12-29-24 @ 13:38)  HR: 84 (12-30-24 @ 06:09) (84 - 97)  BP: 113/65 (12-30-24 @ 06:09) (113/65 - 132/80)  RR: 18 (12-30-24 @ 06:09) (18 - 19)  SpO2: 98% (12-30-24 @ 06:09) (98% - 99%)  Vent Data   Intake/ Output   Measurements       Physical Exam  * General Appearance: Alert, cooperative, interactive, oriented to time, place, and person, in no acute distress  * Eyes: PERRL, conjunctiva/corneas clear, EOM's intact, fundi benign, both eyes  * Neck: Supple, symmetrical, trachea midline, no adenopathy   * Lungs: Good bilateral air entry, normal breath sounds (Clear to auscultation bilaterally, no audible wheezes, crackles, or rhonchi)  * Heart: Regular Rate and Rhythm, normal S1 and S2, no audible murmur, rub, or gallop  * Abdomen: Symmetric, non-distended, soft, non-tender, bowel sounds active all four quadrants, no masses, hepatosplenomegaly +  * Rectal: as below  * Extremities: no lower extremity pitting edema bilaterally      Investigations   Laboratory Workup      - CBC:                        12.9   5.93  )-----------( 102      ( 30 Dec 2024 08:40 )             38.4       - Hgb Trend:  12.9  12-30-24 @ 08:40  13.5  12-29-24 @ 15:01        - Chemistry:  12-30    137  |  101  |  10  ----------------------------<  99  4.3   |  24  |  0.9    Ca    9.3      30 Dec 2024 08:40  Mg     2.3     12-30    TPro  7.1  /  Alb  4.2  /  TBili  1.1  /  DBili  x   /  AST  152[H]  /  ALT  203[H]  /  AlkPhos  83  12-30    Liver panel trend:  TBili 1.1   /      /      /   AlkP 83   /   Tptn 7.1   /   Alb 4.2    /   DBili --      12-30  TBili 1.0   /      /      /   AlkP 87   /   Tptn 7.4   /   Alb 4.4    /   DBili --      12-29      - Coagulation Studies:  PT/INR - ( 29 Dec 2024 15:01 )   PT: 11.10 sec;   INR: 0.94 ratio    PTT - ( 29 Dec 2024 15:01 )  PTT:29.5 sec        Microbiological Workup  Urinalysis Basic - ( 30 Dec 2024 08:40 )    Color: x / Appearance: x / SG: x / pH: x  Gluc: 99 mg/dL / Ketone: x  / Bili: x / Urobili: x   Blood: x / Protein: x / Nitrite: x   Leuk Esterase: x / RBC: x / WBC x   Sq Epi: x / Non Sq Epi: x / Bacteria: x          Radiological Workup  CT Abdomen and Pelvis w/ IV Cont:   ACC: [id number] EXAM:  CT ABDOMEN AND PELVIS IC   ORDERED BY: AJAY KAN     PROCEDURE DATE:  12/29/2024          INTERPRETATION:  CLINICAL STATEMENT: Rectal bleeding.    TECHNIQUE: Contiguous axial CT images were obtained from the lower chest   to the pubic symphysis following administration of 100cc Omnipaque 350   intravenous contrast.  Oral contrast was not administered.  Reformatted   images in the coronal and sagittal planes were acquired.    COMPARISON: None.    FINDINGS:    LOWER CHEST: Unremarkable.    HEPATOBILIARY: Mild hepatomegaly, 19.5 cm in length. Gallbladder, biliary   system unremarkable.    SPLEEN: Splenomegaly, 21.5 cm in length.    PANCREAS: Unremarkable.    ADRENAL GLANDS: Unremarkable.    KIDNEYS: No hydronephrosis.    ABDOMINOPELVIC NODES: Unremarkable.    PELVIC ORGANS: Unremarkable.    PERITONEUM/MESENTERY/BOWEL: Unremarkable.    BONES/SOFT TISSUES: Unremarkable.    VASCULAR: Unremarkable.      IMPRESSION:  No evidence of acute abdominal pathology.  Hepatosplenomegaly, with marked splenomegaly, 21.5 cm in length.    --- End of Report ---            NAZIA NGUYEN MD; Attending Radiologist  This document has been electronically signed. Dec 29 2024  4:03PM (12-29-24 @ 15:50)            Current Medications  Standing Medications  influenza   Vaccine 0.5 milliLiter(s) IntraMuscular once  pantoprazole    Tablet 40 milliGRAM(s) Oral before breakfast    PRN Medications    Singles Doses Administered

## 2024-12-31 VITALS
SYSTOLIC BLOOD PRESSURE: 116 MMHG | DIASTOLIC BLOOD PRESSURE: 61 MMHG | RESPIRATION RATE: 18 BRPM | HEART RATE: 89 BPM | OXYGEN SATURATION: 99 % | TEMPERATURE: 99 F

## 2024-12-31 LAB
ALBUMIN SERPL ELPH-MCNC: 3.7 G/DL — SIGNIFICANT CHANGE UP (ref 3.5–5.2)
ALP SERPL-CCNC: 80 U/L — SIGNIFICANT CHANGE UP (ref 30–115)
ALT FLD-CCNC: 217 U/L — HIGH (ref 0–41)
ANION GAP SERPL CALC-SCNC: 10 MMOL/L — SIGNIFICANT CHANGE UP (ref 7–14)
AST SERPL-CCNC: 158 U/L — HIGH (ref 0–41)
BASOPHILS # BLD AUTO: 0.08 K/UL — SIGNIFICANT CHANGE UP (ref 0–0.2)
BASOPHILS NFR BLD AUTO: 1.2 % — HIGH (ref 0–1)
BILIRUB SERPL-MCNC: 1 MG/DL — SIGNIFICANT CHANGE UP (ref 0.2–1.2)
BUN SERPL-MCNC: 12 MG/DL — SIGNIFICANT CHANGE UP (ref 10–20)
CALCIUM SERPL-MCNC: 8.6 MG/DL — SIGNIFICANT CHANGE UP (ref 8.4–10.4)
CHLORIDE SERPL-SCNC: 102 MMOL/L — SIGNIFICANT CHANGE UP (ref 98–110)
CO2 SERPL-SCNC: 25 MMOL/L — SIGNIFICANT CHANGE UP (ref 17–32)
CREAT SERPL-MCNC: 0.8 MG/DL — SIGNIFICANT CHANGE UP (ref 0.7–1.5)
EBV DNA SERPL NAA+PROBE-ACNC: SIGNIFICANT CHANGE UP IU/ML
EBV EA AB SER IA-ACNC: 20.1 U/ML — HIGH
EBV EA AB TITR SER IF: POSITIVE
EBV EA IGG SER-ACNC: POSITIVE
EBV NA IGG SER IA-ACNC: 346 U/ML — HIGH
EBV PATRN SPEC IB-IMP: SIGNIFICANT CHANGE UP
EBV VCA IGG AVIDITY SER QL IA: POSITIVE
EBV VCA IGM SER IA-ACNC: 76.9 U/ML — HIGH
EBV VCA IGM SER IA-ACNC: >160 U/ML — HIGH
EBV VCA IGM TITR FLD: POSITIVE
EBVPCR LOG: SIGNIFICANT CHANGE UP LOG10IU/ML
EGFR: 121 ML/MIN/1.73M2 — SIGNIFICANT CHANGE UP
EOSINOPHIL # BLD AUTO: 0.02 K/UL — SIGNIFICANT CHANGE UP (ref 0–0.7)
EOSINOPHIL NFR BLD AUTO: 0.3 % — SIGNIFICANT CHANGE UP (ref 0–8)
GLUCOSE SERPL-MCNC: 90 MG/DL — SIGNIFICANT CHANGE UP (ref 70–99)
HAV IGM SER-ACNC: SIGNIFICANT CHANGE UP
HBV CORE IGM SER-ACNC: SIGNIFICANT CHANGE UP
HBV SURFACE AG SER-ACNC: SIGNIFICANT CHANGE UP
HCT VFR BLD CALC: 36.5 % — LOW (ref 42–52)
HCV AB S/CO SERPL IA: 0.15 S/CO — SIGNIFICANT CHANGE UP (ref 0–0.99)
HCV AB SERPL-IMP: SIGNIFICANT CHANGE UP
HGB BLD-MCNC: 12.5 G/DL — LOW (ref 14–18)
IMM GRANULOCYTES NFR BLD AUTO: 0.2 % — SIGNIFICANT CHANGE UP (ref 0.1–0.3)
INR BLD: 1.03 RATIO — SIGNIFICANT CHANGE UP (ref 0.65–1.3)
LYMPHOCYTES # BLD AUTO: 4.83 K/UL — HIGH (ref 1.2–3.4)
LYMPHOCYTES # BLD AUTO: 74.4 % — HIGH (ref 20.5–51.1)
MAGNESIUM SERPL-MCNC: 2.1 MG/DL — SIGNIFICANT CHANGE UP (ref 1.8–2.4)
MCHC RBC-ENTMCNC: 30.9 PG — SIGNIFICANT CHANGE UP (ref 27–31)
MCHC RBC-ENTMCNC: 34.2 G/DL — SIGNIFICANT CHANGE UP (ref 32–37)
MCV RBC AUTO: 90.1 FL — SIGNIFICANT CHANGE UP (ref 80–94)
MONOCYTES # BLD AUTO: 0.39 K/UL — SIGNIFICANT CHANGE UP (ref 0.1–0.6)
MONOCYTES NFR BLD AUTO: 6 % — SIGNIFICANT CHANGE UP (ref 1.7–9.3)
NEUTROPHILS # BLD AUTO: 1.16 K/UL — LOW (ref 1.4–6.5)
NEUTROPHILS NFR BLD AUTO: 17.9 % — LOW (ref 42.2–75.2)
NRBC # BLD: 0 /100 WBCS — SIGNIFICANT CHANGE UP (ref 0–0)
PLATELET # BLD AUTO: 91 K/UL — LOW (ref 130–400)
PMV BLD: 12.1 FL — HIGH (ref 7.4–10.4)
POTASSIUM SERPL-MCNC: 4.3 MMOL/L — SIGNIFICANT CHANGE UP (ref 3.5–5)
POTASSIUM SERPL-SCNC: 4.3 MMOL/L — SIGNIFICANT CHANGE UP (ref 3.5–5)
PROT SERPL-MCNC: 6.5 G/DL — SIGNIFICANT CHANGE UP (ref 6–8)
PROTHROM AB SERPL-ACNC: 12.2 SEC — SIGNIFICANT CHANGE UP (ref 9.95–12.87)
RBC # BLD: 4.05 M/UL — LOW (ref 4.7–6.1)
RBC # FLD: 13.7 % — SIGNIFICANT CHANGE UP (ref 11.5–14.5)
SODIUM SERPL-SCNC: 137 MMOL/L — SIGNIFICANT CHANGE UP (ref 135–146)
WBC # BLD: 6.49 K/UL — SIGNIFICANT CHANGE UP (ref 4.8–10.8)
WBC # FLD AUTO: 6.49 K/UL — SIGNIFICANT CHANGE UP (ref 4.8–10.8)

## 2024-12-31 PROCEDURE — 99231 SBSQ HOSP IP/OBS SF/LOW 25: CPT

## 2024-12-31 PROCEDURE — 76705 ECHO EXAM OF ABDOMEN: CPT | Mod: 26

## 2024-12-31 PROCEDURE — 99239 HOSP IP/OBS DSCHRG MGMT >30: CPT

## 2024-12-31 RX ADMIN — PANTOPRAZOLE 40 MILLIGRAM(S): 40 TABLET, DELAYED RELEASE ORAL at 05:45

## 2024-12-31 NOTE — DISCHARGE NOTE NURSING/CASE MANAGEMENT/SOCIAL WORK - FINANCIAL ASSISTANCE
[facility] provides services at a reduced cost to those who are determined to be eligible through [facility]’s financial assistance program. Information regarding [facility]’s financial assistance program can be found by going to [email or web address] or by calling [phone].

## 2024-12-31 NOTE — DISCHARGE NOTE PROVIDER - PROVIDER TOKENS
PROVIDER:[TOKEN:[[id number]],FOLLOWUP:[1 week]],PROVIDER:[TOKEN:[[id number]],FOLLOWUP:[1 week]]

## 2024-12-31 NOTE — PROGRESS NOTE ADULT - ASSESSMENT
33 yo Man no PMHx presented for BRPBR. Reports intermittent over 1 week. Stool is formed, but not hard. No blood in stool "drips out" after BM. Reports URI 2 weeks ago that caused severe exhaustion. Upon ER arrival pt found to have hepatosplenomegaly, transaminitis, thrombocytopenia.    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

## 2024-12-31 NOTE — DISCHARGE NOTE PROVIDER - CARE PROVIDERS DIRECT ADDRESSES
[email or web address]

## 2024-12-31 NOTE — PROGRESS NOTE ADULT - SUBJECTIVE AND OBJECTIVE BOX
DAYANAZIA ADAMSON  34y  Male  ***My note supersedes ALL resident notes that I sign.  My corrections for their notes are in my note.***    I can be reached directly via Teams or my office number is [phone] or [phone].    INTERVAL EVENTS: Here for f/u of mono. Pt has multiple female sex partners. Pt works as . Pt lifts weights in gym, but no usual contact sports. Pt feels well enough to go home. No abd pain.    T(F): 98.9 (12-31-24 @ 04:57), Max: 100.4 (12-30-24 @ 20:15)  HR: 89 (12-31-24 @ 04:57) (89 - 102)  BP: 116/61 (12-31-24 @ 04:57) (116/61 - 116/75)  RR: 18 (12-31-24 @ 04:57) (18 - 18)  SpO2: 99% (12-31-24 @ 04:57) (97% - 99%)    Gen: NAD  skin: no jaundice; no rash  HEENT: PERRL, EOMI, scl white; mouth clr, nose clr  Neck: no nodes, no JVD, thyroid nl  lungs: clr  hrt: s1 s2 rrr no murmur  abd: soft, NT/ND, mild hepatomeg (best apprec toward left liver); mild splenomeg (best apprec at lt costal margin more medially) - non-tender  ext: no edema, no c/c  neuro: aa ox3, cn intact, can move all 4 ext    LABS:                      12.5    (    90.1   6.49  )-----------( ---------      91       ( 31 Dec 2024 05:15 )             36.5    (    13.7     Platelet Count - Automated: 91 K/uL (12-31-24 @ 05:15)  Platelet Count - Automated: 102 K/uL (12-30-24 @ 08:40)  Platelet Count - Automated: 104 K/uL (12-29-24 @ 15:01)    137   (   102   (   90      12-31-24 @ 05:15  ----------------------               4.3   (   25   (   12                             -----                        0.8  Ca  8.6   Mg  2.1    P   --     LFT  6.5  (  1.0  (  158       12-31-24 @ 05:15  -------------------------  3.7  (  80  (  217    Alb 3.7  T gil 1.0   AP 80      12-31-24 @ 05:15 - stable  Alb 4.2  T gil 1.1   AP 83      12-30-24 @ 08:40  Alb 4.4  T gil 1.0   AP 87      12-29-24 @ 15:01    PT/INR - ( 31 Dec 2024 05:15 )   PT: 12.20 sec;   INR: 1.03 ratio    PTT - ( 29 Dec 2024 15:01 )  PTT: 29.5 sec    Urinalysis Basic - ( 31 Dec 2024 05:15 )    Color: x / Appearance: x / SG: x / pH: x  Gluc: 90 mg/dL / Ketone: x  / Bili: x / Urobili: x   Blood: x / Protein: x / Nitrite: x   Leuk Esterase: x / RBC: x / WBC x   Sq Epi: x / Non Sq Epi: x / Bacteria: x    RADIOLOGY & ADDITIONAL TESTS:  < from: US Abdomen Upper Quadrant Right (12.31.24 @ 10:34) >  Gallbladder wall thickening may be due to a mildly contracted   gallbladder. Negative sonographic Monroy's sign.    < end of copied text >    < from: Xray Chest 1 View- PORTABLE-Urgent (Xray Chest 1 View- PORTABLE-Urgent .) (12.29.24 @ 17:44) >  No radiographic evidence of acute cardiopulmonary disease.    < end of copied text >    < from: CT Abdomen and Pelvis w/ IV Cont (12.29.24 @ 15:50) >  No evidence of acute abdominal pathology.  Hepatosplenomegaly, with marked splenomegaly, 21.5 cm in length.    < end of copied text >    MEDICATIONS:    influenza   Vaccine 0.5 milliLiter(s) IntraMuscular once  melatonin 3 milliGRAM(s) Oral at bedtime  pantoprazole    Tablet 40 milliGRAM(s) Oral before breakfast  
Hepatology Follow Up Note      Location: [address] ([address]  Patient Name: NAZIA STAPLETON  Age: 34y  Gender: Male      Chief Complaint  Patient is a 34y old Male who presents with a chief complaint of   Primary diagnosis of Hepatomegaly with splenomegaly, not elsewhere classified      Reason for Consult  Elevated Liver Enzymes  Hematochezia      Progress Note  Patient denies abdominal pain or nausea or vomiting.  His appetite is adequate.  He had 1 brown formed stool on 12/30.      Vital Signs in the last 24 hours   T(C): 37.2 (31 Dec 2024 04:57), Max: 38 (30 Dec 2024 20:15)  T(F): 98.9 (31 Dec 2024 04:57), Max: 100.4 (30 Dec 2024 20:15)  HR: 89 (31 Dec 2024 04:57) (89 - 102)  BP: 116/61 (31 Dec 2024 04:57) (116/61 - 116/75)  BP(mean): --  ABP: --  ABP(mean): --  RR: 18 (31 Dec 2024 04:57) (18 - 18)  SpO2: 99% (31 Dec 2024 04:57) (97% - 99%)    O2 Parameters below as of 31 Dec 2024 04:57  Patient On (Oxygen Delivery Method): room air        Physical Exam  * General Appearance: Alert, cooperative, interactive, oriented to time, place, and person, in no acute distress  * Eyes: PERRL, conjunctiva/corneas clear, EOM's intact, fundi benign, both eyes  * Neck: Supple, symmetrical, trachea midline, no adenopathy   * Lungs: Good bilateral air entry, normal breath sounds (Clear to auscultation bilaterally, no audible wheezes, crackles, or rhonchi)  * Heart: Regular Rate and Rhythm, normal S1 and S2, no audible murmur, rub, or gallop  * Abdomen: Symmetric, non-distended, soft, non-tender, bowel sounds active all four quadrants, no masses, hepatosplenomegaly +  * Rectal: as below  * Extremities: no lower extremity pitting edema bilaterally      Investigations         12.5   6.49  )-----------( 91       ( 31 Dec 2024 05:15 )             36.5     12-31    137  |  102  |  12  ----------------------------<  90  4.3   |  25  |  0.8    Ca    8.6      31 Dec 2024 05:15  Mg     2.1     12-31    TPro  6.5  /  Alb  3.7  /  TBili  1.0  /  DBili  x   /  AST  158[H]  /  ALT  217[H]  /  AlkPhos  80  12-31        LIVER FUNCTIONS - ( 31 Dec 2024 05:15 )  Alb: 3.7 g/dL / Pro: 6.5 g/dL / ALK PHOS: 80 U/L / ALT: 217 U/L / AST: 158 U/L / GGT: x           PT/INR - ( 31 Dec 2024 05:15 )   PT: 12.20 sec;   INR: 1.03 ratio         PTT - ( 29 Dec 2024 15:01 )  PTT:29.5 sec    Urinalysis Basic - ( 31 Dec 2024 05:15 )    Color: x / Appearance: x / SG: x / pH: x  Gluc: 90 mg/dL / Ketone: x  / Bili: x / Urobili: x   Blood: x / Protein: x / Nitrite: x   Leuk Esterase: x / RBC: x / WBC x   Sq Epi: x / Non Sq Epi: x / Bacteria: x        Current Medications  Standing Medications  influenza   Vaccine 0.5 milliLiter(s) IntraMuscular once  pantoprazole    Tablet 40 milliGRAM(s) Oral before breakfast    PRN Medications    Singles Doses Administered      
CHIEF COMPLAINT:    Patient is a 34y old  Male who presents with a chief complaint of BRBPR    INTERVAL HPI/OVERNIGHT EVENTS:    Patient seen and examined at bedside. No acute overnight events occurred.    ROS: Denies abdominal pain All other systems are negative.    Medications:  Standing  influenza   Vaccine 0.5 milliLiter(s) IntraMuscular once  pantoprazole    Tablet 40 milliGRAM(s) Oral before breakfast    PRN Meds        Vital Signs:    T(F): 98 (12-30-24 @ 06:09), Max: 98.9 (12-29-24 @ 13:38)  HR: 84 (12-30-24 @ 06:09) (84 - 97)  BP: 113/65 (12-30-24 @ 06:09) (113/65 - 132/80)  RR: 18 (12-30-24 @ 06:09) (18 - 19)  SpO2: 98% (12-30-24 @ 06:09) (98% - 99%)    PHYSICAL EXAM:  GENERAL:  NAD  SKIN: No rashes or lesions  HEENT: Atraumatic. Normocephalic. Anicteric  NECK:  No JVD.   PULMONARY: Clear to ausculation bilaterally. No wheezing. No rales  CVS: Normal S1, S2. Regular rate and rhythm. No murmurs.  ABDOMEN/GI: Soft, Nontender, Nondistended; Bowel sounds are present spleen palpated on exam  EXTREMITIES:  No edema B/L LE.  NEUROLOGIC:  No motor deficit.  PSYCH: Alert & oriented x 3, normal affect      LABS:                        12.9   5.93  )-----------( 102      ( 30 Dec 2024 08:40 )             38.4     12-30    137  |  101  |  10  ----------------------------<  99  4.3   |  24  |  0.9    Ca    9.3      30 Dec 2024 08:40  Mg     2.3     12-30    TPro  7.1  /  Alb  4.2  /  TBili  1.1  /  DBili  x   /  AST  152[H]  /  ALT  203[H]  /  AlkPhos  83  12-30    PT/INR - ( 29 Dec 2024 15:01 )   PT: 11.10 sec;   INR: 0.94 ratio         PTT - ( 29 Dec 2024 15:01 )  PTT:29.5 sec          RADIOLOGY & ADDITIONAL TESTS:  Imaging or report Personally Reviewed:  [ ] YES  [ ] NO -->no new images    Telemetry reviewed independently - NSR, no acute events  EKG reviewed independently -->no new EKGs    Consultant(s) Notes Reviewed:  [ ] YES  [ ] NO  Care Discussed with Consultants/Other Providers [ ] YES  [ ] NO    Case discussed with resident  Care discussed with pt

## 2024-12-31 NOTE — DISCHARGE NOTE PROVIDER - NSDCCPCAREPLAN_GEN_ALL_CORE_FT
PRINCIPAL DISCHARGE DIAGNOSIS  Diagnosis: Hepatosplenomegaly  Assessment and Plan of Treatment: You came in with a low platelet count (thrombocytopenia), some abnormalities in your liver tests, and an enlarged liver and spleen (hepatosplenomegaly). This happened after you had a recent cold, and it looks like it might be due to a virus called Alex-Barr virus (EBV), also known as mono. We saw some specific blood cells called reactive lymphocytes, which further suggests this. We've also tested for some other infections like CMV, HIV, and hepatitis just to be thorough.  You also mentioned some rectal bleeding. We think it's likely from hemorrhoids, but we want to be sure. Because of the bleeding, the liver specialists recommend a colonoscopy as an outpatient to take a closer look.   Because your spleen might be enlarged from the possible mono infection, it's really important to avoid anything that could injure it. That means no strenuous activities or contact sports for at least a month, or until your primary care doctor gives you the okay. Start slowly with activity as you feel better. Make sure you drink plenty of fluids, and avoid alcohol for now.      SECONDARY DISCHARGE DIAGNOSES  Diagnosis: Low platelet count  Assessment and Plan of Treatment:

## 2024-12-31 NOTE — DISCHARGE NOTE NURSING/CASE MANAGEMENT/SOCIAL WORK - PATIENT PORTAL LINK FT
You can access the FollowMyHealth Patient Portal offered by [facility] by registering at the following website: [email or web address]. By joining [employer]s FollowMyHealth portal, you will also be able to view your health information using other applications (apps) compatible with our system.

## 2024-12-31 NOTE — DISCHARGE NOTE PROVIDER - HOSPITAL COURSE
35 yo M no PMHx presented for BRPBR. Reports intermittent over 1 week. Stool is formed but not hard. No blood in stool "drips out" after BM. Reports URI 2 weeks ago that caused severe exhaustion. Upon ER arrival pt found to have hepatosplenomegaly, transaminitis, thrombocytopenia.    Summary: patient presents with thrombocytopenia, elevated liver function tests, and hepatosplenomegaly following a recent upper respiratory infection, likely 2/2 Alex-Barr virus infection, given the presence of reactive lymphocytes. CMV, HIV, and a hepatitis panel have also been ordered. The patient denies any new medications or supplements and reports occasional alcohol use. Additionally, the patient is experiencing bright red per rectum bleeding, suspected to be hemorrhoidal. Although hemoglobin decreased slightly from 13.5 to 12.8 g/dL, this could be due to hemoconcentration upon admission. Hepatology recommended outpatient colonoscopy. A digital rectal exam was reportedly performed in the emergency department. After discharge pt should avoid strenuous activities, including contact sports, for at least a month or until PCP clears to prevent splenic rupture. Pt should gradually increase activity levels as symptoms improve, drink plenty of fluids and avoid alcohol.     Discussion of discharge plan of care, including discharge diagnoses, medication reconciliation, and follow-ups was conducted with Dr. Pineda on 12/31/24, and discharge was approved.

## 2024-12-31 NOTE — DISCHARGE NOTE PROVIDER - CARE PROVIDER_API CALL
Lavell Long  Internal Medicine  [address] [place] [address]  Phone: [phone]  Fax: [phone]  Follow Up Time: 1 week    Sarah Higgins  Geriatric Medicine  [address] [place] [address]  Phone: [phone]  Fax: [phone]  Follow Up Time: 1 week

## 2024-12-31 NOTE — PROGRESS NOTE ADULT - ATTENDING COMMENTS
34 y o  M who presented with bloody stool seen for elevated liver enzymes with hepatosplenomegaly.     Has been sick for 2 weeks. Started with cough about 2 weeks ago with night sweats and fatigue.  No sore throat or myalgia.  No wt loss.  No sick contacts.    No complaints.  Looks well  Oral cavity - unable to visualize tonsillar fossa  No cervical or axillary LNE  Abdomen soft non tender    Acute hepatitis with hepatosplenomegaly and thrombocytopenia - likely EBV infection  Patient has acute symptoms reactive lymphocytes with monocytosis thrombocytopenia and hepatosplenomegaly suggestive of viral infection most likely EBV.       EBV serology and PCR pending  Screen for acute hepatitis  Monitor liver tests as OP. Can be discharged home from hepatology stand point

## 2024-12-31 NOTE — PROGRESS NOTE ADULT - ASSESSMENT
Assessment and Plan  Case of a 34 year old male patient with history of alcohol use who presented to the ED on 12/29 for evaluation of bloody stools x1 week associated with recent fatigue, night sweats, and subjective fevers, found to have elevated liver enzymes and thrombocytopenia on blood work with evidence of hepatosplenomegaly on imaging. We are consulted for elevated liver enzymes.      Elevated Liver Enzymes: Mainly Hepatocellular Injury  Hepatosplenomegaly with Thrombocytopenia and B symptoms: rule out Viral Infection (EBV, etc..) VS lymphoma  Rule Out DILI In Setting of Recent Amoxicillin Course  Unlikely Alcoholic Hepatitis  * History: notes red blood per rectum with brown stools (2-3 times daily for 4 days last week; then stopped for 3 days then recurred on 12/29 x1 episode); denies abdominal pain or nausea or vomiting or unintentional weight loss; ROS positive for productive cough, fevers, chills, rhinorrhea 2 weeks ago s/p urgent care visit and dx of bronchitis (received amoxicillin/steroids; denies sick contacts at home but suspects sick contacts at work); denies new medications or herbal supplements  * Social history: drinks few beers on weekends (socially; upto 10 beers); ex smoker; denies IVDU; sexually active with multiple female partners; uses protein powder  * Hemodynamically stable during admission; reported subjective fevers prior to admission    * Labs: WBC 5.93, Hb 12.9, Plt 102, Na 137, K 4.3, BUN 10, Cr 0.9 on 12/30 -> WBC 6.49, Hb 12.5, Plt 91, Na 137, K 4.3, BUN 12, Cr 0.8 on 12/31  * Hb trend: 13.5 on 12/29 -> 12.9 on 12/30 -> 12.5 on 12/31 (baseline Hb 14.8 in 2015)  * Plt trend: 104 on 12/29 -> 102 on 12/30 -> 91 on 12/31 (baseline plt 151 in 2015)  * Liver enzymes: 1/87/165/212 on 12/29 -> 1.1/83/152/203 on 12/30 -> 1/80/158/217 on 12/31 (baseline: 0.3/56/26/32 in 2015)  * INR 0.94 on 12/29 -> 1.03 on 12/31  * COVID Influenza and RSV - on 12/29  * Acute hepatitis panel pending  * No US abdomen; No TTE in chart  * CT AP IC 12/29 mild hepatomegaly 19.5cm; splenomegaly 21.5 cm; normal GB and CBD  * No previous EGD/Colonoscopy per patient  * Family history of celiac disease in daughter; no GI or liver cancers in family    RECOMMENDATIONS  - Trend liver enzymes  - Follow up acute hepatitis A/B/C panel (received not resulted)  - Follow up EBV serology (received) and DNA PCR. Avoid contact sports; counseled about risk of splenic rupture   - Check US abdomen  - Follow up peripheral smear. If warranted, consider hematology evaluation in setting of HSPM, thrombocytopenia, and B symptoms   - Counseled about importance of alcohol cessation. SBIRT consult pending  - Avoid hepatotoxic agents  - Monitor MAP and keep > 65mmH. Monitor for fever and check sepsis workup (reported subjective fevers)  - Follow up with our [facility] for fibroscan (located at [address] [place] [address], [id number]. Telephone No: [phone])      Reported Hematochezia Likely Secondary to Hemorrhoidal bleeding; rule out malignancy VS Angiodysplasia VS other sources  No Anemia  * Hemodynamically stable & no active bleeding  * Baseline hemoglobin (prior to admission):   * Labs: WBC 5.93, Hb 12.9, Plt 102, Na 137, K 4.3, BUN 10, Cr 0.9 on 12/30  * Hb trend: 13.5 on 12/29 -> 12.9 on 12/30 -> 12.5 on 12/31 (baseline Hb 14.8 in 2015)  * Plt trend: 104 on 12/29 -> 102 on 12/30 -> 91 on 12/31 (baseline plt 151 in 2015)  * INR 0.94 on 12/29 -> 1.03 on 12/31  * Not on AP or AC  * CHEL not performed on 12/30 per patient since he was in hallway  * CT imaging as above  * No previous EGD/Colonoscopy per patient  * Family history of celiac disease in daughter; no GI or liver cancers in family    RECOMMENDATIONS  - In the setting of reported hematochezia, patient would benefit from colonoscopy   - After discussing the risks and benefits of the procedure with the patient, decision was made to perform procedure as an outpatient (unless Hb downtrends or bleeding recurs). Follow up with our [facility] located at [address] [facility] [place] [address]. Phone Number: [phone]    - In the setting of stable Hb and absence of overt bleeding, can advance diet as tolerated  - Trend CBC closely and transfuse as needed (target Hb >7). Maintain active Type and screen  - Trend BUN  - Ensure placement of x2 large 18G IV Bores  - Monitor MAP and keep >65mmhg   - Continue PO pantoprazole 40 mg QD  - Monitor BM for hematochezia. Avoid constipation  - Please avoid any NSAIDs  - If any unstable bleed, please check CT angio AP IC STAT and call GI/IR stat      Thank you for your consult.  - Please note that plan was communicated with medical team.   - Please reach GI on [phone] during weekdays till 5pm.  - Please call the GI service line after 5pm on Weekdays and anytime on Weekends: [phone].      Maricel Torrez MD  PGY - 5 Gastroenterology Fellow   [facility]

## 2025-01-02 LAB — HIV 1+2 AB+HIV1 P24 AG SERPL QL IA: SIGNIFICANT CHANGE UP

## 2025-01-08 DIAGNOSIS — B27.00 GAMMAHERPESVIRAL MONONUCLEOSIS WITHOUT COMPLICATION: ICD-10-CM

## 2025-01-08 DIAGNOSIS — K64.9 UNSPECIFIED HEMORRHOIDS: ICD-10-CM

## 2025-01-08 DIAGNOSIS — R74.01 ELEVATION OF LEVELS OF LIVER TRANSAMINASE LEVELS: ICD-10-CM

## 2025-01-08 DIAGNOSIS — R16.2 HEPATOMEGALY WITH SPLENOMEGALY, NOT ELSEWHERE CLASSIFIED: ICD-10-CM

## 2025-01-08 DIAGNOSIS — D69.6 THROMBOCYTOPENIA, UNSPECIFIED: ICD-10-CM

## 2025-01-08 DIAGNOSIS — K62.5 HEMORRHAGE OF ANUS AND RECTUM: ICD-10-CM
